# Patient Record
Sex: MALE
[De-identification: names, ages, dates, MRNs, and addresses within clinical notes are randomized per-mention and may not be internally consistent; named-entity substitution may affect disease eponyms.]

---

## 2018-07-16 PROBLEM — Z00.00 ENCOUNTER FOR PREVENTIVE HEALTH EXAMINATION: Status: ACTIVE | Noted: 2018-07-16

## 2018-10-29 ENCOUNTER — APPOINTMENT (OUTPATIENT)
Dept: DERMATOLOGY | Facility: CLINIC | Age: 48
End: 2018-10-29
Payer: COMMERCIAL

## 2018-10-29 PROCEDURE — 99203 OFFICE O/P NEW LOW 30 MIN: CPT

## 2024-09-17 ENCOUNTER — APPOINTMENT (OUTPATIENT)
Dept: ORTHOPEDIC SURGERY | Facility: CLINIC | Age: 54
End: 2024-09-17
Payer: COMMERCIAL

## 2024-09-17 ENCOUNTER — APPOINTMENT (OUTPATIENT)
Dept: ORTHOPEDIC SURGERY | Facility: CLINIC | Age: 54
End: 2024-09-17

## 2024-09-17 VITALS
DIASTOLIC BLOOD PRESSURE: 72 MMHG | BODY MASS INDEX: 24.38 KG/M2 | SYSTOLIC BLOOD PRESSURE: 115 MMHG | WEIGHT: 180 LBS | HEART RATE: 77 BPM | HEIGHT: 72 IN

## 2024-09-17 DIAGNOSIS — F17.200 NICOTINE DEPENDENCE, UNSPECIFIED, UNCOMPLICATED: ICD-10-CM

## 2024-09-17 DIAGNOSIS — Z86.39 PERSONAL HISTORY OF OTHER ENDOCRINE, NUTRITIONAL AND METABOLIC DISEASE: ICD-10-CM

## 2024-09-17 DIAGNOSIS — Z86.79 PERSONAL HISTORY OF OTHER DISEASES OF THE CIRCULATORY SYSTEM: ICD-10-CM

## 2024-09-17 DIAGNOSIS — Z83.3 FAMILY HISTORY OF DIABETES MELLITUS: ICD-10-CM

## 2024-09-17 DIAGNOSIS — S92.515A NONDISPLACED FRACTURE OF PROXIMAL PHALANX OF LEFT LESSER TOE(S), INITIAL ENCOUNTER FOR CLOSED FRACTURE: ICD-10-CM

## 2024-09-17 DIAGNOSIS — Z82.49 FAMILY HISTORY OF ISCHEMIC HEART DISEASE AND OTHER DISEASES OF THE CIRCULATORY SYSTEM: ICD-10-CM

## 2024-09-17 DIAGNOSIS — Z78.9 OTHER SPECIFIED HEALTH STATUS: ICD-10-CM

## 2024-09-17 PROCEDURE — 73660 X-RAY EXAM OF TOE(S): CPT | Mod: LT

## 2024-09-17 PROCEDURE — 99203 OFFICE O/P NEW LOW 30 MIN: CPT

## 2024-09-17 RX ORDER — ROSUVASTATIN CALCIUM 5 MG/1
TABLET, FILM COATED ORAL
Refills: 0 | Status: ACTIVE | COMMUNITY

## 2024-09-17 RX ORDER — LISINOPRIL 30 MG/1
TABLET ORAL
Refills: 0 | Status: ACTIVE | COMMUNITY

## 2024-09-17 NOTE — HISTORY OF PRESENT ILLNESS
[de-identified] : 9/17/24: JOSEF is a 54-year-old male that presents today for initial valuation of left foot pain status post an injury which occurred several days ago.  He reports stubbing his pinky toe on a piece of his garage door.  He presents today with a chief complaint of intermittent, moderate dull aching pain at the pinky toe, greatest over the proximal phalange.  He subsequently seen at [facility] where he was told that there was no evidence of fracture. He has noted some persistent discomfort with weightbearing, more pronounced during the pushoff phase of gait.  He does also report some mild clicking in the area of concern.  He did wish to proceed with orthopedic follow-up. His discomfort has been well-controlled with the use of Tylenol and ice.  Of note, patient is a non-insulin-dependent diabetic.  He states that this is well-controlled with diet and exercise.

## 2024-09-17 NOTE — DISCUSSION/SUMMARY
[de-identified] : Assessment: 54-year-old male with a past medical history significant for non-insulin-dependent diabetes status post an injury to the left foot with clinical signs and symptoms consistent with a nondisplaced proximal phalange, possible distal phalange fracture of the fifth toe. His diagnosis and treatment options were reviewed.  Treatment: 1.  Patient deferred wearing a postop shoe. -Discussed the benefits of a hard soled more supportive footwear in conjunction with buddy taping over the next 6 weeks. 2.  We discussed that this injury will take up to 6 weeks to heal. 3.  Tylenol Motrin as needed for discomfort. 4.  Patient will avoid any high impact activity until his pain begins to improve. 5.  Follow-up with podiatry in 6 weeks. Sooner, should he have any reinjury or worsening of discomfort.  All questions answered.

## 2024-09-17 NOTE — DISCUSSION/SUMMARY
[de-identified] : Assessment: 54-year-old male with a past medical history significant for non-insulin-dependent diabetes status post an injury to the left foot with clinical signs and symptoms consistent with a nondisplaced proximal phalange, possible distal phalange fracture of the fifth toe. His diagnosis and treatment options were reviewed.  Treatment: 1.  Patient deferred wearing a postop shoe. -Discussed the benefits of a hard soled more supportive footwear in conjunction with buddy taping over the next 6 weeks. 2.  We discussed that this injury will take up to 6 weeks to heal. 3.  Tylenol Motrin as needed for discomfort. 4.  Patient will avoid any high impact activity until his pain begins to improve. 5.  Follow-up with podiatry in 6 weeks. Sooner, should he have any reinjury or worsening of discomfort.  All questions answered.

## 2024-09-17 NOTE — PHYSICAL EXAM
[de-identified] : GENERAL APPEARANCE: Well nourished and hydrated, pleasant, alert, and oriented x 3. Appears their stated age.  HEENT: Normocephalic, extraocular eye motion intact. Nasal septum midline. Oral cavity clear. External auditory canal clear.  RESPIRATORY: Breath sounds clear and audible in all lobes. No wheezing, No accessory muscle use. CARDIOVASCULAR: No apparent abnormalities. No lower leg edema. No varicosities. Pedal pulses are palpable. NEUROLOGIC: Sensation is normal, no muscle weakness in the upper or lower extremities. DERMATOLOGIC: No apparent skin lesions, moist, warm, no rash. SPINE: Cervical spine appears normal and moves freely; thoracic spine appears normal and moves freely; lumbosacral spine appears normal and moves freely, normal, nontender. MUSCULOSKELETAL: Hands, wrists, and elbows are normal and move freely, shoulders are normal and move freely.  PSYCHIATRIC: Oriented to person, place, and time, insight and judgement were intact and the affect was normal.  Examination of the LEFT foot shows, mild swelling of the pinky toe, no deformity, proximal phalange of the pinky tenderness to palpation.  Normal toe ROM.  No gross instability.  Toe Strength: 5/5 flexion, 5/5 extension RIGHT foot demonstrates full, pain-free, stable ROM and strength.   [de-identified] : X-rays were obtained of the patient's left pinky toe, AP, lateral, oblique views.  There is evidence of a nondisplaced obliquely oriented fracture through the proximal phalange of the fifth toe.  This does not extend intra-articularly.  On the lateral view there is a questionable nondisplaced transverse fracture at the body of the distal phalange.

## 2024-09-17 NOTE — PHYSICAL EXAM
[de-identified] : GENERAL APPEARANCE: Well nourished and hydrated, pleasant, alert, and oriented x 3. Appears their stated age.  HEENT: Normocephalic, extraocular eye motion intact. Nasal septum midline. Oral cavity clear. External auditory canal clear.  RESPIRATORY: Breath sounds clear and audible in all lobes. No wheezing, No accessory muscle use. CARDIOVASCULAR: No apparent abnormalities. No lower leg edema. No varicosities. Pedal pulses are palpable. NEUROLOGIC: Sensation is normal, no muscle weakness in the upper or lower extremities. DERMATOLOGIC: No apparent skin lesions, moist, warm, no rash. SPINE: Cervical spine appears normal and moves freely; thoracic spine appears normal and moves freely; lumbosacral spine appears normal and moves freely, normal, nontender. MUSCULOSKELETAL: Hands, wrists, and elbows are normal and move freely, shoulders are normal and move freely.  PSYCHIATRIC: Oriented to person, place, and time, insight and judgement were intact and the affect was normal.  Examination of the LEFT foot shows, mild swelling of the pinky toe, no deformity, proximal phalange of the pinky tenderness to palpation.  Normal toe ROM.  No gross instability.  Toe Strength: 5/5 flexion, 5/5 extension RIGHT foot demonstrates full, pain-free, stable ROM and strength.   [de-identified] : X-rays were obtained of the patient's left pinky toe, AP, lateral, oblique views.  There is evidence of a nondisplaced obliquely oriented fracture through the proximal phalange of the fifth toe.  This does not extend intra-articularly.  On the lateral view there is a questionable nondisplaced transverse fracture at the body of the distal phalange.

## 2024-09-17 NOTE — HISTORY OF PRESENT ILLNESS
[de-identified] : 9/17/24: JOSEF is a 54-year-old male that presents today for initial valuation of left foot pain status post an injury which occurred several days ago.  He reports stubbing his pinky toe on a piece of his garage door.  He presents today with a chief complaint of intermittent, moderate dull aching pain at the pinky toe, greatest over the proximal phalange.  He subsequently seen at [facility] where he was told that there was no evidence of fracture. He has noted some persistent discomfort with weightbearing, more pronounced during the pushoff phase of gait.  He does also report some mild clicking in the area of concern.  He did wish to proceed with orthopedic follow-up. His discomfort has been well-controlled with the use of Tylenol and ice.  Of note, patient is a non-insulin-dependent diabetic.  He states that this is well-controlled with diet and exercise.

## 2024-09-29 PROBLEM — S92.515A CLOSED NONDISPLACED FRACTURE OF PROXIMAL PHALANX OF LESSER TOE OF LEFT FOOT, INITIAL ENCOUNTER: Status: ACTIVE | Noted: 2024-09-17
